# Patient Record
Sex: FEMALE | Race: OTHER | URBAN - METROPOLITAN AREA
[De-identification: names, ages, dates, MRNs, and addresses within clinical notes are randomized per-mention and may not be internally consistent; named-entity substitution may affect disease eponyms.]

---

## 2018-09-25 NOTE — PATIENT DISCUSSION
CONJUNCTIVITIS (ALLERGIC), OU:  PRESCRIBE ASHU SMITH (GAVE SAMPLE OF PAZEO), PRESCRIBE LOTEMAX QID x 1 week then BID x 1 week then D/C.   RETURN FOR FOLLOW-UP AS SCHEDULED.

## 2018-09-25 NOTE — PATIENT DISCUSSION
DRY EYE SYNDROME OU: RX ARTIFICIAL TEARS AS NEEDED TO INCREASE COMFORT OU. LOTEMAX QID x 1 week then BID x 1 week then D/C. IF SYMPTOMS PERSIST CONSIDER PUNCTAL PLUGS OR RESTASIS. CALL/RTC IF SYMPTOMS INCREASE/PERSIST.

## 2019-06-28 ENCOUNTER — IMPORTED ENCOUNTER (OUTPATIENT)
Dept: URBAN - METROPOLITAN AREA CLINIC 50 | Facility: CLINIC | Age: 39
End: 2019-06-28

## 2019-07-02 ENCOUNTER — IMPORTED ENCOUNTER (OUTPATIENT)
Dept: URBAN - METROPOLITAN AREA CLINIC 50 | Facility: CLINIC | Age: 39
End: 2019-07-02

## 2019-07-08 ENCOUNTER — IMPORTED ENCOUNTER (OUTPATIENT)
Dept: URBAN - METROPOLITAN AREA CLINIC 50 | Facility: CLINIC | Age: 39
End: 2019-07-08

## 2019-07-08 NOTE — PATIENT DISCUSSION
"""Resolving. Patient was referred to a rheumotlogist last visit.  Patient seeing Dr Clarissa Franz ""

## 2019-07-30 ENCOUNTER — IMPORTED ENCOUNTER (OUTPATIENT)
Dept: URBAN - METROPOLITAN AREA CLINIC 50 | Facility: CLINIC | Age: 39
End: 2019-07-30

## 2019-08-01 ENCOUNTER — IMPORTED ENCOUNTER (OUTPATIENT)
Dept: URBAN - METROPOLITAN AREA CLINIC 50 | Facility: CLINIC | Age: 39
End: 2019-08-01

## 2019-09-10 ENCOUNTER — IMPORTED ENCOUNTER (OUTPATIENT)
Dept: URBAN - METROPOLITAN AREA CLINIC 50 | Facility: CLINIC | Age: 39
End: 2019-09-10

## 2019-09-10 NOTE — PATIENT DISCUSSION
"""Patient diagnosed with AS.  Dr. Smith Miller suggested treatment with Humira but ultimately wants ""

## 2019-09-17 ENCOUNTER — IMPORTED ENCOUNTER (OUTPATIENT)
Dept: URBAN - METROPOLITAN AREA CLINIC 50 | Facility: CLINIC | Age: 39
End: 2019-09-17

## 2019-09-17 NOTE — PATIENT DISCUSSION
""" """"Risks associated with contact lens wear discussed with patient.  Instructed patient to remove lenses ""

## 2019-10-15 ENCOUNTER — IMPORTED ENCOUNTER (OUTPATIENT)
Dept: URBAN - METROPOLITAN AREA CLINIC 50 | Facility: CLINIC | Age: 39
End: 2019-10-15

## 2019-11-18 ENCOUNTER — IMPORTED ENCOUNTER (OUTPATIENT)
Dept: URBAN - METROPOLITAN AREA CLINIC 50 | Facility: CLINIC | Age: 39
End: 2019-11-18

## 2020-12-04 ENCOUNTER — IMPORTED ENCOUNTER (OUTPATIENT)
Dept: URBAN - METROPOLITAN AREA CLINIC 50 | Facility: CLINIC | Age: 40
End: 2020-12-04

## 2020-12-22 ENCOUNTER — IMPORTED ENCOUNTER (OUTPATIENT)
Dept: URBAN - METROPOLITAN AREA CLINIC 50 | Facility: CLINIC | Age: 40
End: 2020-12-22

## 2020-12-22 NOTE — PATIENT DISCUSSION
"""Patient left eye doing much better. Will continue Durezol twice a day and Lumigan before bed.  ""

## 2021-01-29 ENCOUNTER — IMPORTED ENCOUNTER (OUTPATIENT)
Dept: URBAN - METROPOLITAN AREA CLINIC 50 | Facility: CLINIC | Age: 41
End: 2021-01-29

## 2021-02-01 ENCOUNTER — IMPORTED ENCOUNTER (OUTPATIENT)
Dept: URBAN - METROPOLITAN AREA CLINIC 50 | Facility: CLINIC | Age: 41
End: 2021-02-01

## 2021-02-09 ENCOUNTER — IMPORTED ENCOUNTER (OUTPATIENT)
Dept: URBAN - METROPOLITAN AREA CLINIC 50 | Facility: CLINIC | Age: 41
End: 2021-02-09

## 2021-02-23 ENCOUNTER — IMPORTED ENCOUNTER (OUTPATIENT)
Dept: URBAN - METROPOLITAN AREA CLINIC 50 | Facility: CLINIC | Age: 41
End: 2021-02-23

## 2021-02-23 NOTE — PATIENT DISCUSSION
"""IOP is elevated OS.  Will start timoptic twice a day for 10 days and continue to the Lumigan at ""

## 2021-03-09 ENCOUNTER — IMPORTED ENCOUNTER (OUTPATIENT)
Dept: URBAN - METROPOLITAN AREA CLINIC 50 | Facility: CLINIC | Age: 41
End: 2021-03-09

## 2021-03-19 ENCOUNTER — IMPORTED ENCOUNTER (OUTPATIENT)
Dept: URBAN - METROPOLITAN AREA CLINIC 50 | Facility: CLINIC | Age: 41
End: 2021-03-19

## 2021-04-13 ENCOUNTER — IMPORTED ENCOUNTER (OUTPATIENT)
Dept: URBAN - METROPOLITAN AREA CLINIC 50 | Facility: CLINIC | Age: 41
End: 2021-04-13

## 2021-04-17 ASSESSMENT — VISUAL ACUITY
OS_SC: 20/40
OD_SC: 20/20
OD_SC: 20/20-2
OD_SC: 20/20
OS_PH: 20/20
OD_SC: 20/20
OS_PH: 20/20
OD_CC: J1+@ 17 IN
OS_PH: 20/20
OD_PH: @ 17 IN
OD_CC: J1+@ 12 IN
OD_SC: 20/20-1
OS_CC: J1+@ 12 IN
OS_SC: 20/40
OD_SC: 20/20
OS_CC: J1+@ 16 IN
OS_SC: 20/50-1
OS_CC: J1+@ 17 IN
OS_SC: 20/40+1
OS_SC: 20/50
OS_SC: 20/30-
OD_SC: 20/20
OS_SC: 20/25-1
OS_SC: 20/40
OS_PH: 20/25+1
OD_SC: 20/20-2
OS_PH: @ 17 IN
OS_SC: 20/50
OS_PH: 20/20-
OS_SC: 20/40-1
OD_SC: 20/20
OD_CC: J1+@ 16 IN
OS_PH: 20/20
OD_SC: 20/20
OS_PH: 20/25

## 2021-04-17 ASSESSMENT — TONOMETRY
OD_IOP_MMHG: 10
OS_IOP_MMHG: 15
OD_IOP_MMHG: 09
OS_IOP_MMHG: 16
OS_IOP_MMHG: 10
OS_IOP_MMHG: 13
OS_IOP_MMHG: 09
OS_IOP_MMHG: 29
OD_IOP_MMHG: 10
OD_IOP_MMHG: 10
OD_IOP_MMHG: 11
OD_IOP_MMHG: 10
OS_IOP_MMHG: 30
OS_IOP_MMHG: 10
OS_IOP_MMHG: 12
OD_IOP_MMHG: 12
OS_IOP_MMHG: 19
OD_IOP_MMHG: 12
OS_IOP_MMHG: 05
OD_IOP_MMHG: 11
OD_IOP_MMHG: 09

## 2021-08-27 ENCOUNTER — PROBLEM (OUTPATIENT)
Dept: URBAN - METROPOLITAN AREA CLINIC 53 | Facility: CLINIC | Age: 41
End: 2021-08-27

## 2021-08-27 DIAGNOSIS — H40.042: ICD-10-CM

## 2021-08-27 DIAGNOSIS — H20.12: ICD-10-CM

## 2021-08-27 PROCEDURE — 92012 INTRM OPH EXAM EST PATIENT: CPT

## 2021-08-27 RX ORDER — DUREZOL 0.5 MG/ML: 1 EMULSION OPHTHALMIC TWICE A DAY

## 2021-08-27 RX ORDER — BIMATOPROST 0.1 MG/ML: 1 SOLUTION/ DROPS OPHTHALMIC EVERY EVENING

## 2021-08-27 ASSESSMENT — VISUAL ACUITY
OD_SC: 20/20
OS_PH: 20/20
OS_SC: 20/30-1

## 2021-08-27 ASSESSMENT — TONOMETRY
OD_IOP_MMHG: 12
OS_IOP_MMHG: 12

## 2021-08-27 NOTE — PATIENT DISCUSSION
Durezol twice a day for two weeks and once a day for one week in the left eye. Will see her back in 3 weeks. If she goes down to one time a day and notices that she is getting symptoms again advised to go back to twice a day and call our office.

## 2021-08-27 NOTE — PATIENT DISCUSSION
Starting her on Durezol twice a day and she has a history of steroid response. Will start her on Lumigan once on the left at night time.

## 2021-09-17 ENCOUNTER — 3 WEEK FOLLOW-UP (OUTPATIENT)
Dept: URBAN - METROPOLITAN AREA CLINIC 53 | Facility: CLINIC | Age: 41
End: 2021-09-17

## 2021-09-17 DIAGNOSIS — H40.042: ICD-10-CM

## 2021-09-17 DIAGNOSIS — H20.12: ICD-10-CM

## 2021-09-17 PROCEDURE — 92012 INTRM OPH EXAM EST PATIENT: CPT

## 2021-09-17 ASSESSMENT — TONOMETRY
OS_IOP_MMHG: 19
OS_IOP_MMHG: 21
OD_IOP_MMHG: 13

## 2021-09-17 ASSESSMENT — VISUAL ACUITY
OS_SC: 20/30
OS_PH: 20/20
OD_SC: 20/20

## 2021-09-17 NOTE — PATIENT DISCUSSION
Resolved, OS. Continue lumigan for 3 days. Stop Durezol. If symptoms appear patient to start Durezol every other day and call the office to schedule follow up.

## 2023-06-02 ENCOUNTER — ESTABLISHED PATIENT (OUTPATIENT)
Dept: URBAN - METROPOLITAN AREA CLINIC 49 | Facility: CLINIC | Age: 43
End: 2023-06-02

## 2023-06-02 DIAGNOSIS — H20.011: ICD-10-CM

## 2023-06-02 PROCEDURE — 92012 INTRM OPH EXAM EST PATIENT: CPT

## 2023-06-02 RX ORDER — PREDNISOLONE ACETATE 10 MG/ML: 1 SUSPENSION/ DROPS OPHTHALMIC

## 2023-06-02 ASSESSMENT — TONOMETRY
OD_IOP_MMHG: 10
OS_IOP_MMHG: 11

## 2023-06-02 ASSESSMENT — VISUAL ACUITY
OU_SC: 20/20
OD_SC: 20/20-2
OS_SC: 20/20

## 2023-06-09 ENCOUNTER — FOLLOW UP (OUTPATIENT)
Dept: URBAN - METROPOLITAN AREA CLINIC 49 | Facility: CLINIC | Age: 43
End: 2023-06-09

## 2023-06-09 DIAGNOSIS — H20.011: ICD-10-CM

## 2023-06-09 DIAGNOSIS — H40.042: ICD-10-CM

## 2023-06-09 PROCEDURE — 92012 INTRM OPH EXAM EST PATIENT: CPT

## 2023-06-09 ASSESSMENT — VISUAL ACUITY
OD_SC: 20/25
OU_SC: 20/20
OS_SC: 20/20

## 2023-06-09 ASSESSMENT — TONOMETRY
OD_IOP_MMHG: 12
OS_IOP_MMHG: 12

## 2023-06-23 ENCOUNTER — FOLLOW UP (OUTPATIENT)
Dept: URBAN - METROPOLITAN AREA CLINIC 49 | Facility: CLINIC | Age: 43
End: 2023-06-23

## 2023-06-23 DIAGNOSIS — H20.011: ICD-10-CM

## 2023-06-23 PROCEDURE — 92012 INTRM OPH EXAM EST PATIENT: CPT

## 2023-06-23 ASSESSMENT — VISUAL ACUITY
OS_SC: 20/25
OD_SC: 20/25

## 2023-06-23 ASSESSMENT — TONOMETRY
OS_IOP_MMHG: 13
OD_IOP_MMHG: 13